# Patient Record
Sex: MALE | Race: WHITE | NOT HISPANIC OR LATINO | URBAN - METROPOLITAN AREA
[De-identification: names, ages, dates, MRNs, and addresses within clinical notes are randomized per-mention and may not be internally consistent; named-entity substitution may affect disease eponyms.]

---

## 2017-03-08 ENCOUNTER — EMERGENCY (EMERGENCY)
Facility: HOSPITAL | Age: 62
LOS: 1 days | Discharge: HOME | End: 2017-03-08
Attending: EMERGENCY MEDICINE | Admitting: EMERGENCY MEDICINE
Payer: COMMERCIAL

## 2017-03-08 VITALS
HEART RATE: 83 BPM | SYSTOLIC BLOOD PRESSURE: 179 MMHG | WEIGHT: 227.08 LBS | RESPIRATION RATE: 16 BRPM | HEIGHT: 71 IN | DIASTOLIC BLOOD PRESSURE: 104 MMHG | OXYGEN SATURATION: 97 % | TEMPERATURE: 98 F

## 2017-03-08 VITALS
HEART RATE: 81 BPM | DIASTOLIC BLOOD PRESSURE: 80 MMHG | OXYGEN SATURATION: 96 % | RESPIRATION RATE: 20 BRPM | SYSTOLIC BLOOD PRESSURE: 145 MMHG

## 2017-03-08 DIAGNOSIS — E78.00 PURE HYPERCHOLESTEROLEMIA, UNSPECIFIED: ICD-10-CM

## 2017-03-08 DIAGNOSIS — I10 ESSENTIAL (PRIMARY) HYPERTENSION: ICD-10-CM

## 2017-03-08 DIAGNOSIS — Z87.891 PERSONAL HISTORY OF NICOTINE DEPENDENCE: ICD-10-CM

## 2017-03-08 DIAGNOSIS — E78.5 HYPERLIPIDEMIA, UNSPECIFIED: ICD-10-CM

## 2017-03-08 DIAGNOSIS — Z98.890 OTHER SPECIFIED POSTPROCEDURAL STATES: Chronic | ICD-10-CM

## 2017-03-08 DIAGNOSIS — R07.89 OTHER CHEST PAIN: ICD-10-CM

## 2017-03-08 DIAGNOSIS — Z79.82 LONG TERM (CURRENT) USE OF ASPIRIN: ICD-10-CM

## 2017-03-08 DIAGNOSIS — Z79.01 LONG TERM (CURRENT) USE OF ANTICOAGULANTS: ICD-10-CM

## 2017-03-08 DIAGNOSIS — R42 DIZZINESS AND GIDDINESS: ICD-10-CM

## 2017-03-08 DIAGNOSIS — Z79.899 OTHER LONG TERM (CURRENT) DRUG THERAPY: ICD-10-CM

## 2017-03-08 DIAGNOSIS — I25.10 ATHEROSCLEROTIC HEART DISEASE OF NATIVE CORONARY ARTERY WITHOUT ANGINA PECTORIS: ICD-10-CM

## 2017-03-08 PROCEDURE — 82553 CREATINE MB FRACTION: CPT

## 2017-03-08 PROCEDURE — 99285 EMERGENCY DEPT VISIT HI MDM: CPT | Mod: 25

## 2017-03-08 PROCEDURE — 82550 ASSAY OF CK (CPK): CPT

## 2017-03-08 PROCEDURE — 93010 ELECTROCARDIOGRAM REPORT: CPT | Mod: 59

## 2017-03-08 PROCEDURE — 93005 ELECTROCARDIOGRAM TRACING: CPT

## 2017-03-08 PROCEDURE — 36415 COLL VENOUS BLD VENIPUNCTURE: CPT

## 2017-03-08 PROCEDURE — 71045 X-RAY EXAM CHEST 1 VIEW: CPT

## 2017-03-08 PROCEDURE — 85025 COMPLETE CBC W/AUTO DIFF WBC: CPT

## 2017-03-08 PROCEDURE — 84484 ASSAY OF TROPONIN QUANT: CPT

## 2017-03-08 PROCEDURE — 85610 PROTHROMBIN TIME: CPT

## 2017-03-08 PROCEDURE — 80053 COMPREHEN METABOLIC PANEL: CPT

## 2017-03-08 PROCEDURE — 99284 EMERGENCY DEPT VISIT MOD MDM: CPT | Mod: 25

## 2017-03-08 PROCEDURE — 85730 THROMBOPLASTIN TIME PARTIAL: CPT

## 2017-03-08 PROCEDURE — 96374 THER/PROPH/DIAG INJ IV PUSH: CPT

## 2017-03-08 PROCEDURE — 71010: CPT | Mod: 26

## 2017-03-08 RX ORDER — EZETIMIBE 10 MG/1
1 TABLET ORAL
Qty: 0 | Refills: 0 | COMMUNITY

## 2017-03-08 RX ORDER — RABEPRAZOLE 20 MG/1
1 TABLET, DELAYED RELEASE ORAL
Qty: 0 | Refills: 0 | COMMUNITY

## 2017-03-08 RX ORDER — NITROGLYCERIN 6.5 MG
0.3 CAPSULE, EXTENDED RELEASE ORAL
Qty: 0 | Refills: 0 | Status: DISCONTINUED | OUTPATIENT
Start: 2017-03-08 | End: 2017-03-12

## 2017-03-08 RX ORDER — POTASSIUM CHLORIDE 20 MEQ
40 PACKET (EA) ORAL ONCE
Qty: 0 | Refills: 0 | Status: COMPLETED | OUTPATIENT
Start: 2017-03-08 | End: 2017-03-08

## 2017-03-08 RX ORDER — MORPHINE SULFATE 50 MG/1
2 CAPSULE, EXTENDED RELEASE ORAL ONCE
Qty: 0 | Refills: 0 | Status: DISCONTINUED | OUTPATIENT
Start: 2017-03-08 | End: 2017-03-08

## 2017-03-08 RX ORDER — ASPIRIN/CALCIUM CARB/MAGNESIUM 324 MG
325 TABLET ORAL ONCE
Qty: 0 | Refills: 0 | Status: COMPLETED | OUTPATIENT
Start: 2017-03-08 | End: 2017-03-08

## 2017-03-08 RX ORDER — RAMIPRIL 5 MG
1 CAPSULE ORAL
Qty: 0 | Refills: 0 | COMMUNITY

## 2017-03-08 RX ORDER — ATORVASTATIN CALCIUM 80 MG/1
1 TABLET, FILM COATED ORAL
Qty: 0 | Refills: 0 | COMMUNITY

## 2017-03-08 RX ORDER — CLOPIDOGREL BISULFATE 75 MG/1
1 TABLET, FILM COATED ORAL
Qty: 0 | Refills: 0 | COMMUNITY

## 2017-03-08 RX ORDER — IBUPROFEN 200 MG
600 TABLET ORAL ONCE
Qty: 0 | Refills: 0 | Status: COMPLETED | OUTPATIENT
Start: 2017-03-08 | End: 2017-03-08

## 2017-03-08 RX ORDER — ASPIRIN/CALCIUM CARB/MAGNESIUM 324 MG
1 TABLET ORAL
Qty: 0 | Refills: 0 | COMMUNITY

## 2017-03-08 RX ORDER — OLMESARTAN MEDOXOMIL 5 MG/1
1 TABLET, FILM COATED ORAL
Qty: 0 | Refills: 0 | COMMUNITY

## 2017-03-08 RX ORDER — ONDANSETRON 8 MG/1
4 TABLET, FILM COATED ORAL ONCE
Qty: 0 | Refills: 0 | Status: COMPLETED | OUTPATIENT
Start: 2017-03-08 | End: 2017-03-08

## 2017-03-08 RX ADMIN — Medication 325 MILLIGRAM(S): at 15:04

## 2017-03-08 RX ADMIN — ONDANSETRON 4 MILLIGRAM(S): 8 TABLET, FILM COATED ORAL at 15:04

## 2017-03-08 RX ADMIN — Medication 0.3 MILLIGRAM(S): at 15:04

## 2017-03-08 RX ADMIN — Medication 40 MILLIEQUIVALENT(S): at 15:04

## 2017-03-08 RX ADMIN — Medication 600 MILLIGRAM(S): at 16:24

## 2017-03-08 RX ADMIN — Medication 600 MILLIGRAM(S): at 15:54

## 2017-03-08 NOTE — ED PROVIDER NOTE - ATTENDING CONTRIBUTION TO CARE
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 on exam tenderness at anterior shoulder and reproduction of pain after sitting movement and turning head, nl S1 S2, no m/r/g, no change in pain w nitro, EKG non ischemic and first cardiac enzymes neg.  pt w h/o of stents 16 years ago although all nl stress testing regularly since and no exertional symptoms, suspect pain is musculoskeletal, pt w nl pulses, nl mediastinum and no differential in B.P., doubt dissection. Plan second trop, if neg and remains C.P. free will d/c and pt w plans for stress testing on Friday,

## 2017-03-08 NOTE — ED PROVIDER NOTE - OBJECTIVE STATEMENT
62M with HTN, HLD, former smoker (quit 12 years ago), CAD and MI with PCI x 4 stents most recent in 2000 presents with 3 day h/o chest pain. States it started while driving about 3 days ago and states it does not resemble the chest pain he had at the time of his MI. States he has no exertional symptoms, denies any orthopnea,  pnd, palpitations, syncope. Most recent Stress test 1 yr ago, and was unremarkable, most recent visit to the cardiologist ([facility]) about one month ago and states both were unremarkable. States his physical activity has been gradually limited over the past year and attributes to his worsening plantar fascitis

## 2017-03-08 NOTE — ED PROVIDER NOTE - NONTENDER LOCATION
right upper quadrant/right lower quadrant/periumbilical/left lower quadrant/umbilical/suprapubic/left upper quadrant

## 2017-03-08 NOTE — ED ADULT TRIAGE NOTE - CHIEF COMPLAINT QUOTE
pt c/o CP x 3 days radiating to the left arm & neck and feeling lightheaded. pt has hx of 4 stents & MI.

## 2017-03-08 NOTE — ED PROVIDER NOTE - ENMT, MLM
Airway patent, Nasal mucosa clear. Mouth with normal mucosa. Throat has no vesicles, no oropharyngeal exudates and uvula is midline. No JVD, tracheal deviation

## 2017-03-08 NOTE — ED PROVIDER NOTE - MEDICAL DECISION MAKING DETAILS
62M presenting with atypical chest pain, given his prior h/o MI and PCI, patient is high risk and there is concern for UA for his chest pain. However given that it is also reproducible, MSK origin could be contributory, Initial labs reveal on Hypokalemia, Trop x 1. EKG --> No ischemic changes  Will do a trial of zofran 4 iv x 1, aspirin 325 po x 1 and SL nitro 0.3mg x 1. Will reassess and send 2nd trop and revaluate then. CXR also ordered. 62M presenting with atypical chest pain, given his prior h/o MI and PCI, patient is high risk and there is concern for UA for his chest pain. However given that it is also reproducible, MSK origin could be contributory, Initial labs reveal on Hypokalemia, Trop x 1. EKG --> No ischemic changes  Will do a trial of zofran 4 iv x 1, aspirin 325 po x 1 and SL nitro 0.3mg x 1. Will reassess and send 2nd trop and revaluate then. CXR also ordered.    Patient second troponin negative, pain improved with motrin 600 x 1. Recommended the patient for OP stress test, which he said he will definitely get it done on friday. Patient understands and will FU with OP cardiologist